# Patient Record
Sex: MALE | Race: WHITE | NOT HISPANIC OR LATINO | Employment: OTHER | URBAN - METROPOLITAN AREA
[De-identification: names, ages, dates, MRNs, and addresses within clinical notes are randomized per-mention and may not be internally consistent; named-entity substitution may affect disease eponyms.]

---

## 2017-01-05 ENCOUNTER — PREPPED CHART (OUTPATIENT)
Dept: URBAN - METROPOLITAN AREA CLINIC 43 | Facility: CLINIC | Age: 67
End: 2017-01-05

## 2017-05-31 NOTE — PATIENT DISCUSSION
FRANCISCA OU:  PRESCRIBED UV PROTECTION TO SLOW GROWTH. PRESCRIBE ARTIFICAL TEARS TO INCREASE COMFORT.

## 2018-05-07 NOTE — PATIENT DISCUSSION
Pinguecula Counseling:  I have explained to the patient at length the diagnosis of pinguecula and its pathophysiology. I recommended the patient adequately protect their eyes from excessive UV light and dry, yulissa conditions. The use of artificial tears in dry conditions was encouraged. Return for follow-up as scheduled.

## 2018-05-07 NOTE — PATIENT DISCUSSION
FRANCISCA OU:  PRESCRIBE ARTIFICIAL TEARS BID - QID. DECREASE OUTDOOR EXPOSURE AND USE UV PROTECTION/ SUNGLASSES WITH OUTDOOR ACTIVITIES. RETURN FOR FOLLOW UP AS SCHEDULED.

## 2018-09-13 ENCOUNTER — ESTABLISHED COMPREHENSIVE EXAM (OUTPATIENT)
Dept: URBAN - METROPOLITAN AREA CLINIC 43 | Facility: CLINIC | Age: 68
End: 2018-09-13

## 2018-09-13 DIAGNOSIS — H40.023: ICD-10-CM

## 2018-09-13 PROCEDURE — 92250 FUNDUS PHOTOGRAPHY W/I&R: CPT

## 2018-09-13 PROCEDURE — G8756 NO BP MEASURE DOC: HCPCS

## 2018-09-13 PROCEDURE — G9903 PT SCRN TBCO ID AS NON USER: HCPCS

## 2018-09-13 PROCEDURE — 92014 COMPRE OPH EXAM EST PT 1/>: CPT

## 2018-09-13 PROCEDURE — 9222650 BILAT EXTENDED OPHTHALMOSCOPY, F/U

## 2018-09-13 PROCEDURE — 1036F TOBACCO NON-USER: CPT

## 2018-09-13 PROCEDURE — G8427 DOCREV CUR MEDS BY ELIG CLIN: HCPCS

## 2018-09-13 ASSESSMENT — VISUAL ACUITY
OS_SC: 20/60
OD_SC: J1
OD_SC: 20/70-1
OS_SC: J1
OS_CC: 20/20-2
OD_CC: 20/20-2

## 2018-09-13 ASSESSMENT — TONOMETRY
OS_IOP_MMHG: 14
OD_IOP_MMHG: 15

## 2018-09-20 ENCOUNTER — IOP CHECK (OUTPATIENT)
Dept: URBAN - METROPOLITAN AREA CLINIC 43 | Facility: CLINIC | Age: 68
End: 2018-09-20

## 2018-09-20 DIAGNOSIS — H40.023: ICD-10-CM

## 2018-09-20 DIAGNOSIS — H25.813: ICD-10-CM

## 2018-09-20 PROCEDURE — 1036F TOBACCO NON-USER: CPT

## 2018-09-20 PROCEDURE — 92015 DETERMINE REFRACTIVE STATE: CPT

## 2018-09-20 PROCEDURE — G9903 PT SCRN TBCO ID AS NON USER: HCPCS

## 2018-09-20 PROCEDURE — 92012 INTRM OPH EXAM EST PATIENT: CPT

## 2018-09-20 PROCEDURE — G8427 DOCREV CUR MEDS BY ELIG CLIN: HCPCS

## 2018-09-20 ASSESSMENT — VISUAL ACUITY
OD_CC: 20/20-
OS_SC: J3
OS_CC: 20/20-2
OD_SC: J1

## 2018-09-20 ASSESSMENT — TONOMETRY
OS_IOP_MMHG: 13
OD_IOP_MMHG: 13

## 2019-03-14 NOTE — PATIENT DISCUSSION
POSTERIOR CAPSULAR FIBROSIS, OU:  VISUALLY SIGNIFICANT. OPTION OF YAG LASER VERSUS UPDATING GLASSES VERSUS FOLLOWING DISCUSSED. RBA'S DISCUSSED, PATIENT UNDERSTANDS AND DESIRES YAG LASER TO INCREASE VISION FOR READING , WATCHING TV AND DRIVING AT NIGHT. SCHEDULE YAG LASER OD FIRST, THEN OS.

## 2019-03-25 ENCOUNTER — IOP CHECK (OUTPATIENT)
Dept: URBAN - METROPOLITAN AREA CLINIC 43 | Facility: CLINIC | Age: 69
End: 2019-03-25

## 2019-03-25 DIAGNOSIS — H40.023: ICD-10-CM

## 2019-03-25 PROCEDURE — 92012 INTRM OPH EXAM EST PATIENT: CPT

## 2019-03-25 ASSESSMENT — VISUAL ACUITY
OS_CC: 20/25-2
OD_CC: 20/30+2

## 2019-03-25 ASSESSMENT — TONOMETRY
OD_IOP_MMHG: 18
OS_IOP_MMHG: 18

## 2019-09-18 ENCOUNTER — ESTABLISHED COMPREHENSIVE EXAM (OUTPATIENT)
Dept: URBAN - METROPOLITAN AREA CLINIC 43 | Facility: CLINIC | Age: 69
End: 2019-09-18

## 2019-09-18 DIAGNOSIS — H40.023: ICD-10-CM

## 2019-09-18 DIAGNOSIS — H25.9: ICD-10-CM

## 2019-09-18 DIAGNOSIS — H04.123: ICD-10-CM

## 2019-09-18 DIAGNOSIS — H52.13: ICD-10-CM

## 2019-09-18 PROCEDURE — 92015 DETERMINE REFRACTIVE STATE: CPT

## 2019-09-18 PROCEDURE — 92014 COMPRE OPH EXAM EST PT 1/>: CPT

## 2019-09-18 PROCEDURE — 92083 EXTENDED VISUAL FIELD XM: CPT

## 2019-09-18 ASSESSMENT — VISUAL ACUITY
OS_SC: J2
OD_SC: J3
OS_SC: 20/30
OS_CC: 20/40+2
OD_CC: 20/25-3
OD_SC: 20/40

## 2019-09-18 ASSESSMENT — TONOMETRY
OD_IOP_MMHG: 10
OS_IOP_MMHG: 12

## 2020-03-11 NOTE — PATIENT DISCUSSION
03/11/2020OS-[id number] -&nbsp;SN &nbsp; &nbsp; [facility]

## 2020-08-12 NOTE — PATIENT DISCUSSION
POSTERIOR VITREOUS DETACHMENT, OU (OS SYMPTOMATIC):  NO HOLES. NO TEARS. OCT STABLE. RETINAL  DETACHMENT WARNINGS DISCUSSED. FLOATERS AND FLASHES BROCHURE GIVEN. RETURN FOR FOLLOW-UP AS SCHEDULED.

## 2020-09-10 NOTE — PATIENT DISCUSSION
POSTERIOR VITREOUS DETACHMENT, OS:  NO HOLES. NO TEARS. RETINAL  DETACHMENT WARNINGS DISCUSSED. FLOATERS AND FLASHES BROCHURE GIVEN AT LAST VISIT. RETURN FOR FOLLOW-UP AS SCHEDULED.

## 2021-06-29 NOTE — PATIENT DISCUSSION
DRY EYE SYNDROME OU: RX ARTIFICIAL TEARS AS NEEDED TO INCREASE COMFORT OU. IF SYMPTOMS PERSIST CONSIDER PUNCTAL PLUGS. yes...

## 2022-06-28 NOTE — PATIENT DISCUSSION
Educated patient on findings, condition, and affect on vision. Prescribe artificial tears BID-QID OU and warm compresses QD/prn OU. Discussed good visual hygiene including conscious blinking and careful makeup application. Monitor.